# Patient Record
Sex: MALE | Race: BLACK OR AFRICAN AMERICAN | Employment: FULL TIME | URBAN - METROPOLITAN AREA
[De-identification: names, ages, dates, MRNs, and addresses within clinical notes are randomized per-mention and may not be internally consistent; named-entity substitution may affect disease eponyms.]

---
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
## 2020-06-03 ENCOUNTER — APPOINTMENT (OUTPATIENT)
Dept: GENERAL RADIOLOGY | Age: 20
End: 2020-06-03
Attending: EMERGENCY MEDICINE
Payer: COMMERCIAL

## 2020-06-03 ENCOUNTER — HOSPITAL ENCOUNTER (EMERGENCY)
Age: 20
Discharge: HOME | End: 2020-06-03
Attending: EMERGENCY MEDICINE
Payer: COMMERCIAL

## 2020-06-03 ENCOUNTER — APPOINTMENT (OUTPATIENT)
Dept: CT IMAGING | Age: 20
End: 2020-06-03
Attending: EMERGENCY MEDICINE
Payer: COMMERCIAL

## 2020-06-03 VITALS
HEIGHT: 64 IN | TEMPERATURE: 98.3 F | DIASTOLIC BLOOD PRESSURE: 72 MMHG | WEIGHT: 136.69 LBS | HEART RATE: 69 BPM | OXYGEN SATURATION: 99 % | RESPIRATION RATE: 14 BRPM | SYSTOLIC BLOOD PRESSURE: 112 MMHG | BODY MASS INDEX: 23.34 KG/M2

## 2020-06-03 DIAGNOSIS — S42.002A CLOSED DISPLACED FRACTURE OF LEFT CLAVICLE, UNSPECIFIED PART OF CLAVICLE, INITIAL ENCOUNTER: Primary | ICD-10-CM

## 2020-06-03 DIAGNOSIS — T14.8XXA ABRASION: ICD-10-CM

## 2020-06-03 PROCEDURE — 99283 EMERGENCY DEPT VISIT LOW MDM: CPT

## 2020-06-03 PROCEDURE — 73610 X-RAY EXAM OF ANKLE: CPT

## 2020-06-03 PROCEDURE — 70450 CT HEAD/BRAIN W/O DYE: CPT

## 2020-06-03 PROCEDURE — 73030 X-RAY EXAM OF SHOULDER: CPT

## 2020-06-03 NOTE — LETTER
[address] 
[facility] EMERGENCY DEPT 
[address] [facility] [address] [id number] [phone] Work/School Note Date: 6/3/2020 To Whom It May concern: 
 
Gareth Allan was seen and treated today in the emergency room by the following provider(s): 
Attending Provider: Florencio Escalona MD.   
 
Gareth Allan may return to work on 6/5/2020.  
 
Sincerely,

## 2020-06-04 NOTE — ED NOTES
Discharge paperwork provided to patient at this time. Vital signs stable. Patient in no apparent distress at this time. Mental status at baseline. Discharge paperwork in hand and prescription instructions if applicable. [employer] form filled out by patient/parent. Patient given a copy and second copy placed in folder with ED .       Pt provided a work note and copies of his results to take to Ortho

## 2020-06-04 NOTE — DISCHARGE INSTRUCTIONS
Patient Education     Clean your abrasions in the shower. Keep them clean and dry at home. Please follow up with ortho regarding your clavicle fracture. Broken Collarbone: Care Instructions  Your Care Instructions     You have broken or cracked your collarbone, or clavicle. The collarbone is the long, slightly curved bone that connects the shoulder to the chest. It supports the shoulder. A broken collarbone may take 6 weeks or longer to heal. You will need to wear an arm sling to keep the broken bone from moving while it heals. At first, it may hurt to move your arm. This will get better with time. You heal best when you take good care of yourself. Eat a variety of healthy foods, and don't smoke. Follow-up care is a key part of your treatment and safety. Be sure to make and go to all appointments, and call your doctor if you are having problems. It's also a good idea to know your test results and keep a list of the medicines you take. How can you care for yourself at home? · Wear the sling day and night for as long as your doctor tells you to. You may take off the sling when you bathe. When the sling is off, avoid arm positions or motions that cause or increase pain. · Put ice or a cold pack on your collarbone for 10 to 20 minutes at a time. Try to do this every 1 to 2 hours for the next 3 days (when you are awake) or until the swelling goes down. Put a thin cloth between the ice and your skin. · Be safe with medicines. Take pain medicines exactly as directed. ? If the doctor gave you a prescription medicine for pain, take it as prescribed. ? If you are not taking a prescription pain medicine, ask your doctor if you can take an over-the-counter medicine. ? Do not take two or more pain medicines at the same time unless the doctor told you to. Many pain medicines have acetaminophen, which is Tylenol. Too much acetaminophen (Tylenol) can be harmful.   · Try sleeping with pillows propped under your arm for comfort. · After a few days, put your fingers, wrist, and elbow through their full range of motion several times a day. This will keep them from getting stiff. You may get instructions on rehabilitation exercises you can do when your shoulder starts to heal.  · You may use warm packs after the first 3 days for 15 to 20 minutes at a time to ease pain. · You may notice a bump where the collarbone is broken. Over time, the bump will get smaller. A small bump may remain, but it should not affect your arm's strength or movement. When should you call for help? Call your doctor now or seek immediate medical care if:  · Your fingers become numb, tingly, cool, or pale. · You cannot move your arm. Watch closely for changes in your health, and be sure to contact your doctor if:  · You have new or increased pain. · You have new or increased swelling. · You do not get better as expected. Where can you learn more? Go to http://kristin-cindi.info/  Enter P186 in the search box to learn more about \"Broken Collarbone: Care Instructions. \"  Current as of: March 2, 2020               Content Version: 12.5  © 0556-3037 [employer]. Care instructions adapted under license by [employer] (which disclaims liability or warranty for this information). If you have questions about a medical condition or this instruction, always ask your healthcare professional. [address] any warranty or liability for your use of this information.
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
